# Patient Record
(demographics unavailable — no encounter records)

---

## 2024-12-30 NOTE — HISTORY OF PRESENT ILLNESS
I acknowledged her weight loss previously. I am very happy regarding her success.    [de-identified] : 41 year old patient presents for veins on legs. Reports pain in right leg

## 2024-12-30 NOTE — ASSESSMENT
[FreeTextEntry1] : #varicose veins with right leg pain chronic flaring ordered US venous duplex to rule out DVT  #cherry angiomas reassured benign